# Patient Record
Sex: FEMALE | Race: WHITE | Employment: FULL TIME | URBAN - METROPOLITAN AREA
[De-identification: names, ages, dates, MRNs, and addresses within clinical notes are randomized per-mention and may not be internally consistent; named-entity substitution may affect disease eponyms.]

---

## 2017-11-13 ENCOUNTER — OFFICE VISIT - HEALTHEAST (OUTPATIENT)
Dept: FAMILY MEDICINE | Facility: CLINIC | Age: 42
End: 2017-11-13

## 2017-11-13 DIAGNOSIS — J40 BRONCHITIS: ICD-10-CM

## 2017-11-13 DIAGNOSIS — R07.0 THROAT PAIN: ICD-10-CM

## 2017-11-13 RX ORDER — LIFITEGRAST 50 MG/ML
SOLUTION/ DROPS OPHTHALMIC
Status: SHIPPED | COMMUNITY
Start: 2017-11-06

## 2017-11-13 RX ORDER — ALBUTEROL SULFATE 90 UG/1
2 AEROSOL, METERED RESPIRATORY (INHALATION) EVERY 6 HOURS PRN
Qty: 1 EACH | Refills: 0 | Status: SHIPPED | OUTPATIENT
Start: 2017-11-13

## 2017-11-20 ENCOUNTER — COMMUNICATION - HEALTHEAST (OUTPATIENT)
Dept: INTENSIVE CARE | Facility: HOSPITAL | Age: 42
End: 2017-11-20

## 2017-11-20 DIAGNOSIS — J40 BRONCHITIS: ICD-10-CM

## 2018-04-03 ENCOUNTER — OFFICE VISIT - HEALTHEAST (OUTPATIENT)
Dept: AUDIOLOGY | Facility: CLINIC | Age: 43
End: 2018-04-03

## 2018-04-03 DIAGNOSIS — H91.90 HEARING LOSS, UNSPECIFIED HEARING LOSS TYPE, UNSPECIFIED LATERALITY: ICD-10-CM

## 2018-10-04 ENCOUNTER — OFFICE VISIT - HEALTHEAST (OUTPATIENT)
Dept: FAMILY MEDICINE | Facility: CLINIC | Age: 43
End: 2018-10-04

## 2018-10-04 DIAGNOSIS — J20.9 ACUTE BRONCHITIS: ICD-10-CM

## 2018-10-04 RX ORDER — GUAIFENESIN 600 MG/1
1200 TABLET, EXTENDED RELEASE ORAL 2 TIMES DAILY
Status: SHIPPED | COMMUNITY
Start: 2018-10-04

## 2018-10-04 RX ORDER — IBUPROFEN 200 MG
200 TABLET ORAL EVERY 6 HOURS PRN
Status: SHIPPED | COMMUNITY
Start: 2018-10-04

## 2020-02-03 ENCOUNTER — OFFICE VISIT - HEALTHEAST (OUTPATIENT)
Dept: FAMILY MEDICINE | Facility: CLINIC | Age: 45
End: 2020-02-03

## 2020-02-03 DIAGNOSIS — B97.89 VIRAL LARYNGITIS: ICD-10-CM

## 2020-02-03 DIAGNOSIS — J02.9 SORE THROAT: ICD-10-CM

## 2020-02-03 DIAGNOSIS — J02.9 ACUTE VIRAL PHARYNGITIS: ICD-10-CM

## 2020-02-03 DIAGNOSIS — J04.0 VIRAL LARYNGITIS: ICD-10-CM

## 2020-02-03 LAB — DEPRECATED S PYO AG THROAT QL EIA: NORMAL

## 2020-02-04 ENCOUNTER — COMMUNICATION - HEALTHEAST (OUTPATIENT)
Dept: SCHEDULING | Facility: CLINIC | Age: 45
End: 2020-02-04

## 2020-02-04 LAB — GROUP A STREP BY PCR: NORMAL

## 2020-02-05 ENCOUNTER — COMMUNICATION - HEALTHEAST (OUTPATIENT)
Dept: SCHEDULING | Facility: CLINIC | Age: 45
End: 2020-02-05

## 2020-02-05 ENCOUNTER — OFFICE VISIT - HEALTHEAST (OUTPATIENT)
Dept: FAMILY MEDICINE | Facility: CLINIC | Age: 45
End: 2020-02-05

## 2020-02-05 ENCOUNTER — COMMUNICATION - HEALTHEAST (OUTPATIENT)
Dept: FAMILY MEDICINE | Facility: CLINIC | Age: 45
End: 2020-02-05

## 2020-02-05 DIAGNOSIS — J40 BRONCHITIS: ICD-10-CM

## 2020-02-05 DIAGNOSIS — J20.9 ACUTE BRONCHITIS, UNSPECIFIED ORGANISM: ICD-10-CM

## 2020-02-06 ENCOUNTER — COMMUNICATION - HEALTHEAST (OUTPATIENT)
Dept: SCHEDULING | Facility: CLINIC | Age: 45
End: 2020-02-06

## 2020-10-16 ENCOUNTER — RECORDS - HEALTHEAST (OUTPATIENT)
Dept: LAB | Facility: CLINIC | Age: 45
End: 2020-10-16

## 2020-10-16 LAB
SARS-COV-2 PCR COMMENT: NORMAL
SARS-COV-2 RNA SPEC QL NAA+PROBE: NEGATIVE
SARS-COV-2 VIRUS SPECIMEN SOURCE: NORMAL

## 2020-12-31 ENCOUNTER — THERAPY VISIT (OUTPATIENT)
Dept: PHYSICAL THERAPY | Facility: CLINIC | Age: 45
End: 2020-12-31
Payer: COMMERCIAL

## 2020-12-31 DIAGNOSIS — M62.89 PELVIC FLOOR DYSFUNCTION: ICD-10-CM

## 2020-12-31 DIAGNOSIS — K59.00 CONSTIPATION: ICD-10-CM

## 2020-12-31 PROCEDURE — 97535 SELF CARE MNGMENT TRAINING: CPT | Mod: GP | Performed by: PHYSICAL THERAPIST

## 2020-12-31 PROCEDURE — 97161 PT EVAL LOW COMPLEX 20 MIN: CPT | Mod: GP | Performed by: PHYSICAL THERAPIST

## 2020-12-31 NOTE — PROGRESS NOTES
[facility] Initial Evaluation  Subjective:  The history is provided by the patient. No  was used.   Therapist Generated HPI Evaluation  Problem details: Pt reports she had a hysterectomy 5 years ago and notes that after that things have dropped. Had internal hemorrhoids which she had banded and now she has external hemorrhoids which are very painful and bothersome to her. .         Type of problem:  Pelvic dysfunction and incontinence.      Condition occurred with:  After surgery.    Patient reports pain:  Anal.  Pain is described as aching and burning and is intermittent.  Pain is the same all the time.  Since onset symptoms are gradually worsening.  Exacerbated by: bowel movements.  and relieved by nothing.      Restrictions due to condition include:  Working in normal job without restrictions.  Barriers include:  None as reported by patient.    Patient Health History           General health as reported by patient is good.  Pertinent medical history includes: none.   Red flags:  None as reported by patient.  Medical allergies: none.       Current medications:  None.    Current occupation is Pharmacy tech.   Primary job tasks include:  Computer work, lifting/carrying, pushing/pulling, prolonged sitting, prolonged standing and repetitive tasks.                                    Objective:  System                                 Pelvic Dysfunction Evaluation:        Flexibility:    Tightness present at:Iliopsoas  Tightness not present at:  Adductors; Hamstrings or Gluteals    Abdominal Wall:  Abdominal wall pelvic: Tension and congestion noted throughout abdomen, golden in L lower and cetnral quadrants.        Pelvic Clock Exam:    Ischiocavernosis pain:  -  Bulbocavernosis pain:  -  Transverse Perineal:  -  Levator ANI:  -  Perineal Body:  -      External Assessment:    Skin Condition:  Irritation          Muscle Contraction/Perineal Mobility:  Slight lift, no urogential  triangle descent                         General     ROS  SUBJECTIVE:        Verbal permission from patient to do internal pelvis muscle assessment?Yes Verbal permission to complete external perineal assessment? Yes Would you like someone else in the room as a chaperone? No      Urination:  Do you leak on the way to the bathroom or with a strong urge to void? No   Do you leak with cough,sneeze, jumping, running?Yes   Any other activities that cause leaking? No   Do you have triggers that make you feel you can't wait to go to the bathroom? No what are they NA.  Type of pad and number used per day? 1 pantiliner  When you leak what is the amount? Small  How long can you delay the need to urinate? 4 hours.   How many times do you get up to urinate at night? 2   Can you stop the flow of urine when on the toilet? No  Is the volume of urine passed usually: medium. (8sec rule= 250ml with average bladder storing 400-600ml)  Do you feel empty when you are done?  Yes  Do you strain to pass urine? No  Do you have a slow or hesitant urinary stream? No  Do you have difficulty initiating the urine stream? No  Is urination painful? No  How many bladder infections have you had in last 12 months? 0  Fluid intake(one glass is 8oz or one cup) 1 glasses/day, 2 (coffee, tea, pop) caffinated glasses/day  0 alcohol glasses/day. 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    Bowel habits:  Frequency of bowel movements? 2 times a day  Consistancy of stool? hard, Kipling Stool Scale 1-5  Do you ignore the urge to defecate? No  Do you strain to pass stool? Yes, at times will have to manually evacuate rectally to get stool out at least 2x/wk.   Used to use Metamucil which didn't seem to help.     Aggrevating factors:  Is loss of stool associated with an activity (lifting, coughing, running) or a food)  A little streaking in her underwear 2x/wk and notes it after she's gone to the bathroom and doesn't think she wipes well.    Are there any foods that increase or decrease your symptoms?  When she doesn't eat  Do you have any food allergies?  No      Sensation:   Can you tell if there is solid, liquid, or gas in the rectum?  Yes  Do you feel the urge to move your bowels?  Yes  Is the urge very strong and difficult to control? No Or weak/absent? Yes  Do you feel the rectum is empty with you finish a bowel movement?   Yes    Do you have abdominal pain?  Not unless she has to poop  Describe the quality of the pain: cramping  What makes your abdominal pain worse? Not going  What makes your abdominal pain better? going  Do you ever have pain that wakes you at night? No    Do you have rectal pain, pressure, or burning?  hemorrhoids get painful every time she poops. Hurts to sit. More pain on the R side around 3:15. Do bleed. Doesn't do anything for them at this point.     Pelvic Pain:  Do you have any pelvic pain with intercourse, exams, use of tampons? Yes  Is initial penetration during intercourse painful? No  Is deeper penetration painful? Yes  Do you use lubricant? No What kind? NA  ?  Given birth? Yes Any complications? Almost lost son. Miscarriage x4  # of vaginal delieveries? 0  # of C-sections?1   # of episiotomies? 0.  Are you sexually active?Yes  Have you ever been worried for your physical safety? No    Do you have any depression, anxiety, panic attacks, excessive stress?  No  Any abdominal or pelvic surgeries? Hysterectomy  Are you having any regular exercise? Yes, walking, stairs, lifting weights, abdominal crutching at [employer], leg press, torso rotation.  Have you practiced the PF(kegel) exercises for 4 or more weeks? No  Thyroid checked? No (related to hair loss, flu-like symptoms, wt gain/loss, fatigue, menopause)  Changed diet lately? Yes, eating healthier. Eats 2 meals/day and snacks 3x throughout the day.     OBSERVATION  Lumbar Posture: Negative  Pelvic symmetry: Negative  Introitus: discharge which was milky and white.  Mild odor and redness noted around introitus. Did ask patient about this and she was unaware. Notes that she has recently been on antibiotics so likely has a yeast infection. Further internal vaginal assessment was deferred until pt can see MD and/or treat possible yeast infection.  Trialed internal rectal assessment however patient had significant pain with palpation around hemorrhoids so was unable to complete exam at this time.  Trendelenberg Sign:Negative    ROM  Single leg standing unilateral hip flexion PSIS:Negative  Standing forward flexion PSIS:Negative  Passive Hip ROM:Negative  LAURITA:Negative  LAURITA with OP:Negative    MUSCLE PERFORMANCE  Baseline PF tone:NT due to discharge and question of infection.  PF Tone with cough: NT due to discharge and question of infection.    Valsalva: NT due to discharge and question of infection.          PALPATION: Pain: significant tenderness noted around anal opening including palpation of hemorrhoids. Unable to complete internal rectal assessment due to pain levels.      Assessment/Plan:    Patient is a 45 year old female with pelvic complaints.    Patient has the following significant findings with corresponding treatment plan.                Diagnosis 1:  Pelvic floor dysfunction  Pain -  manual therapy, self management, education and home program  Decreased ROM/flexibility - manual therapy, therapeutic exercise, therapeutic activity and home program  Decreased strength - therapeutic exercise, therapeutic activities and home program  Decreased proprioception - neuro re-education, therapeutic activities and home program  Impaired muscle performance - biofeedback, neuro re-education and home program  Decreased function - therapeutic activities and home program  Impaired posture - neuro re-education, therapeutic activities and home program    Therapy Evaluation Codes:   1) History comprised of:   Personal factors that impact the plan of care:      None.    Comorbidity  factors that impact the plan of care are:      None.     Medications impacting care: None.  2) Examination of Body Systems comprised of:   Body structures and functions that impact the plan of care:      Pelvis.   Activity limitations that impact the plan of care are:      constipation, anal pain.  3) Clinical presentation characteristics are:   Stable/Uncomplicated.  4) Decision-Making    Low complexity using standardized patient assessment instrument and/or measureable assessment of functional outcome.  Cumulative Therapy Evaluation is: Low complexity.    Previous and current functional limitations:  (See Goal Flow Sheet for this information)    Short term and Long term goals: (See Goal Flow Sheet for this information)     Communication ability:  Patient appears to be able to clearly communicate and understand verbal and written communication and follow directions correctly.  Treatment Explanation - The following has been discussed with the patient:   RX ordered/plan of care  Anticipated outcomes  Possible risks and side effects  This patient would benefit from PT intervention to resume normal activities.   Rehab potential is excellent.    Frequency:  1 X week, once daily  Duration:  for 6-8 weeks  Discharge Plan:  Achieve all LTG.  Independent in home treatment program.  Reach maximal therapeutic benefit.    Please refer to the daily flowsheet for treatment today, total treatment time and time spent performing 1:1 timed codes.

## 2021-02-10 ENCOUNTER — RECORDS - HEALTHEAST (OUTPATIENT)
Dept: LAB | Facility: CLINIC | Age: 46
End: 2021-02-10

## 2021-02-10 LAB
ANION GAP SERPL CALCULATED.3IONS-SCNC: 11 MMOL/L (ref 5–18)
BUN SERPL-MCNC: 13 MG/DL (ref 8–22)
CALCIUM SERPL-MCNC: 9.4 MG/DL (ref 8.5–10.5)
CHLORIDE BLD-SCNC: 109 MMOL/L (ref 98–107)
CHOLEST SERPL-MCNC: 191 MG/DL
CO2 SERPL-SCNC: 23 MMOL/L (ref 22–31)
CREAT SERPL-MCNC: 0.73 MG/DL (ref 0.6–1.1)
FASTING STATUS PATIENT QL REPORTED: ABNORMAL
GFR SERPL CREATININE-BSD FRML MDRD: >60 ML/MIN/1.73M2
GLUCOSE BLD-MCNC: 79 MG/DL (ref 70–125)
HDLC SERPL-MCNC: 47 MG/DL
LDLC SERPL CALC-MCNC: 122 MG/DL
POTASSIUM BLD-SCNC: 4.2 MMOL/L (ref 3.5–5)
SODIUM SERPL-SCNC: 143 MMOL/L (ref 136–145)
TRIGL SERPL-MCNC: 112 MG/DL

## 2021-03-25 NOTE — PROGRESS NOTES
DISCHARGE SUMMARY    Lulu Yee was seen 1 times for evaluation and treatment.  Patient did not return for further treatment and current status is unknown.  Due to short treatment duration, no objective or functional changes were made.  Please see goal flow sheet from episode noted date below and initial evaluation for further information.  Patient is discharged from therapy and therapy episode is resolved as of 03/25/21.      Linked Episodes   Type: Episode: Status: Noted: Resolved: Last update: Updated by:   PHYSICAL THERAPY constipation 12/31/2020 Active 12/31/2020 12/31/2020  3:05 PM Mara Davis, PT      Comments:

## 2021-05-27 VITALS
HEART RATE: 93 BPM | SYSTOLIC BLOOD PRESSURE: 119 MMHG | DIASTOLIC BLOOD PRESSURE: 83 MMHG | TEMPERATURE: 98 F | OXYGEN SATURATION: 98 %

## 2021-05-29 ENCOUNTER — RECORDS - HEALTHEAST (OUTPATIENT)
Dept: ADMINISTRATIVE | Facility: CLINIC | Age: 46
End: 2021-05-29

## 2021-05-30 ENCOUNTER — RECORDS - HEALTHEAST (OUTPATIENT)
Dept: ADMINISTRATIVE | Facility: CLINIC | Age: 46
End: 2021-05-30

## 2021-05-31 ENCOUNTER — RECORDS - HEALTHEAST (OUTPATIENT)
Dept: ADMINISTRATIVE | Facility: CLINIC | Age: 46
End: 2021-05-31

## 2021-05-31 VITALS — WEIGHT: 149.9 LBS | BODY MASS INDEX: 24.75 KG/M2

## 2021-06-02 VITALS — WEIGHT: 152.3 LBS | BODY MASS INDEX: 25.15 KG/M2

## 2021-06-04 VITALS
SYSTOLIC BLOOD PRESSURE: 118 MMHG | BODY MASS INDEX: 26.8 KG/M2 | OXYGEN SATURATION: 98 % | TEMPERATURE: 97.8 F | WEIGHT: 162.3 LBS | RESPIRATION RATE: 20 BRPM | HEART RATE: 109 BPM | DIASTOLIC BLOOD PRESSURE: 76 MMHG

## 2021-06-05 ENCOUNTER — RECORDS - HEALTHEAST (OUTPATIENT)
Dept: SCHEDULING | Facility: CLINIC | Age: 46
End: 2021-06-05

## 2021-06-05 DIAGNOSIS — N83.209 OTHER AND UNSPECIFIED OVARIAN CYST: ICD-10-CM

## 2021-06-05 NOTE — TELEPHONE ENCOUNTER
"Pt came back into the clinic after her appt this afternoon demanding the Qvar inhaler.  She was informed that Dr. Armas had left for the day and requested that I send a message to the Dr requesting that she send in the Qvar to the pharmacy.  Pt was informed that the message would not be addressed until tomorrow which she was not happy with and stated that \"it needs to be addressed tonight or she will take her care elsewhere\".    Please advise.  "

## 2021-06-05 NOTE — TELEPHONE ENCOUNTER
RN cannot approve Refill Request    RN can NOT refill this medication PCP messaged that patient is overdue for Office Visit. Last office visit: Visit date not found Last Physical: Visit date not found Last MTM visit: Visit date not found Last visit same specialty: Visit date not found.  Next visit within 3 mo: Visit date not found  Next physical within 3 mo: Visit date not found    Pt called requesting for a refill of the PROAIR albuterol inhaler. Protocol failed PCP or prescribing provider visit in last year.   Was seen in [facility] by Dr. Armas 2/5/2020.    Desiree Vasquez, [facility] Triage/Med Refill 2/5/2020    Requested Prescriptions   Pending Prescriptions Disp Refills     albuterol (PROAIR HFA;PROVENTIL HFA;VENTOLIN HFA) 90 mcg/actuation inhaler 1 each 0     Sig: Inhale 2 puffs every 6 (six) hours as needed for wheezing.       Albuterol/Levalbuterol Refill Protocol Failed - 2/5/2020  9:51 PM        Failed - PCP or prescribing provider visit in last year     Last office visit with prescriber/PCP: Visit date not found OR same dept: Visit date not found OR same specialty: Visit date not found Last physical: Visit date not found       Next appt within 3 mo: Visit date not found  Next physical within 3 mo: Visit date not found  Prescriber OR PCP: Desiree Vasquez RN  Last diagnosis associated with med order: 1. Bronchitis  - albuterol (PROAIR HFA;PROVENTIL HFA;VENTOLIN HFA) 90 mcg/actuation inhaler; Inhale 2 puffs every 6 (six) hours as needed for wheezing.  Dispense: 1 each; Refill: 0    If protocol passes may refill for 6 months if within 3 months of last provider visit (or a total of 9 months). If patient requesting >1 inhaler per month refill x 6 months and have patient make appointment with provider.

## 2021-06-05 NOTE — TELEPHONE ENCOUNTER
"Left detailed message for pt relaying the following message from Dr. Armas:    As we discussed in clinic, Qvar and prednisone are the same thing (both steroids) and the prednisone will do everything the Qvar does. Prescribing both is duplicating treatment without any improvement. Acute \"bronchitis\" should be treated with oral prednisone. Qvar is meant to be a controller medication.   I would like to know how she is feeling after the first 2 doses of prednisone as we discussed. If needing both medications, will need to proceed with PFTs to confirm diagnosis of obstructive lung disease (asthma or COPD) that would warrant an inhaled corticosteroid prescription.   Latesha Armas, DO   "

## 2021-06-05 NOTE — TELEPHONE ENCOUNTER
RN Assessment/Reason for Call:   Okay to leave Detailed Message  Lulu calling in, wanting an inhaler for coughing;   PCP was [facility] gave her QVAr and prednisone last year.  [facility] gave her Decadron and magic mouth wash. Wasn't sure if she needed an inhaler.  Strep test 2/3/2020 was negative.    RN Action/Disposition:  Protocol recommends see Dr in 24 hrs.  Appt [facility] 2/7/2020 [facility]  8:20a  Call back if worse symptoms  Discussed home care measures.  Offered flu immunization.  Agrees to plan.     Reason for Disposition    [1] Continuous (nonstop) coughing interferes with work or school AND [2] no improvement using cough treatment per Care Advice    Protocols used: COUGH - ACUTE [id number]

## 2021-06-05 NOTE — TELEPHONE ENCOUNTER
Who is calling:  patient  Reason for Call:  Patient in calling to get the inhaler her and provider spoke about, she is having a hard time breathing. Offered trg, would like a call back ASAP  Date of last appointment with primary care: na  Okay to leave a detailed message: Yes

## 2021-06-05 NOTE — TELEPHONE ENCOUNTER
Pt was seen in the clinic yesterday for acute Bronchitis and is requesting an Albuterol inhaler be prescribed for her.   Please call Lulu at [phone].    Provider please advise.    Jose Pang RN [facility] Connection Triage/Medication Refill

## 2021-06-14 NOTE — PROGRESS NOTES
Subjective:      Patient ID: Lulu Yee is a 42 y.o. female.    Chief Complaint:    HPI Lulu Yee is a 42 y.o. female who presents today complaining of cough, sore throat, and loss of voice x 3 days. She took Mucinex this morning and Nyquil and cough drops. The Nyquil helped allow her to sleep, but has otherwise not been very helpful. He has no known strep exposures, but her  and son are also feeling ill. She did not receive a flu shot this year. She denies any underlying lung diseases.         Past Surgical History:   Procedure Laterality Date     HYSTERECTOMY  2015     NC LAP,VAG HYST,UTERUS 250GMS/< N/A 11/24/2015    Procedure: HYSTERECTOMY LAPAROSCOPIC ASSISTED VAGINAL, LEFT SALPINGECTOMY;  Surgeon: Risa Maldonado MD;  Location: [facility];  Service: Gynecology       Family History   Problem Relation Age of Onset     Breast cancer Maternal Grandmother        Social History   Substance Use Topics     Smoking status: Never Smoker     Smokeless tobacco: None     Alcohol use No       Review of Systems   Constitutional: Positive for fatigue. Negative for fever.   HENT: Positive for sore throat and voice change. Negative for congestion, ear pain, sinus pain and sinus pressure.    Respiratory: Positive for cough and chest tightness. Negative for shortness of breath.    Gastrointestinal: Negative for abdominal pain, diarrhea, nausea and vomiting.       Objective:     /80  Pulse (!) 110  Temp 98.3  F (36.8  C) (Oral)   Resp 18  Wt 149 lb 14.4 oz (68 kg)  LMP 11/24/2015 (Exact Date)  SpO2 99%  BMI 24.75 kg/m2    Physical Exam   Constitutional: She appears well-developed and well-nourished. No distress.   Mild diaphoresis after patient took her coat off.   HENT:   Head: Normocephalic and atraumatic.   Right Ear: External ear normal.   Left Ear: External ear normal.   Eyes: Conjunctivae are normal.   Cardiovascular: Normal rate, regular rhythm and normal heart sounds.  Exam reveals no  gallop and no friction rub.    No murmur heard.  Pulmonary/Chest: Effort normal and breath sounds normal. No respiratory distress.   Coarse lung sounds throughout. Pateint   Skin: She is diaphoretic.   Psychiatric: She has a normal mood and affect. Her behavior is normal. Judgment and thought content normal.   Nursing note and vitals reviewed.    Labs:  Recent Results (from the past 24 hour(s))   Rapid Strep A Screen-Throat   Result Value Ref Range    Rapid Strep A Antigen No Group A Strep detected, presumptive negative No Group A Strep detected, presumptive negative         Assessment:     Procedures    1. Bronchitis  benzonatate (TESSALON) 200 MG capsule    albuterol (PROAIR HFA;PROVENTIL HFA;VENTOLIN HFA) 90 mcg/actuation inhaler    azithromycin (ZITHROMAX) 250 MG tablet   2. Throat pain  Rapid Strep A Screen-Throat         Patient Instructions   1) Increase fluids and rest  2) Take Tessalon Perles as needed up to three times per day for cough relief.  3) Salt water gargles and lozenges can be helpful for throat relief  4) You will only be notified of the confirmatory strep results if they are positive.   5) Use albuterol inhaler as needed every 6 hours for chest tightness.

## 2021-06-17 NOTE — PROGRESS NOTES
Audiology only; self-referred    History:  Please see chart note/results dated 8-18-16 for additional background information. Hearing was essentially normal, bilaterally, on that date, but she indicated that she is experiencing more difficulty in all listening situations at present, and her spouse thinks she needs hearing aids. She works primarily in the  area at [employer], where there is noise from venting; however, she cannot hear well in quieter environments either. She indicated she must increase volume significantly on her phone in order to use it. She denied tinnitus, dizziness, otalgia, otorrhea, recent illness, or significant noise exposure.    Results:  Otoscopy was unremarkable in either ear. Hearing sensitivity was assessed with good reliability using circumaural phones.      Right ear:   Normal/borderline normal hearing sensitivity for 250-8000 Hz.    Left ear:  Normal/borderline normal hearing sensitivity for 250-2000 Hz and at 6000 Hz; mild mixed hearing loss for 3299-3813 Hz and mild hearing loss (unknown type) at 8000 Hz.  Slight hearing asymmetry was noted between ears; hearing thresholds were essentially stable per 8-18-16 audiogram.     Speech reception thresholds showed agreement with pure tone findings in each ear. Word recognition ability was excellent, bilaterally, with presentation levels above typical conversational volume in both ears.  Speech in noise testing (Quick SIN) yielded responses to +15 dB signal-to-noise ratio in each ear. This latter finding is suggestive of particular auditory processing difficulties related to listening in noise. Additional observation during the appointment today indicated that Ms. Yee also had difficulty hearing in a quiet environment, and relied significantly on visual cues when speaking with the examiner.    Tympanometry was consistent with normal middle ear function, bilaterally.    Recommendations:  Follow-up with PCP; retest hearing per  medical management or patient concern.  Wear hearing protection consistently in noise. Based upon test results, examiner observation, and patient report, further auditory processing testing may be warranted. She was given contact information for Umu Bloom, Ph.D., an audiologist at the [facility] who tests for this concern in adults ([place], [facility]; [phone]). Ms. Yee is not likely to benefit from traditional amplification, as higher noise levels serve as a significant distraction, and hearing is generally normal, bilaterally. Ms. Yee was in verbal agreement with this information and plan.    Silvio Barron., [facility]  [place] Licensed Audiologist [id number]

## 2021-06-18 NOTE — PATIENT INSTRUCTIONS - HE
Patient Instructions by Shannon Sharp MD at 2/3/2020  8:50 AM     Author: Shannon Sharp MD Service: -- Author Type: Physician    Filed: 2/3/2020 10:07 AM Encounter Date: 2/3/2020 Status: Addendum    : Shannon Sharp MD (Physician)    Related Notes: Original Note by Shannon Sharp MD (Physician) filed at 2/3/2020 10:06 AM       1. Keep well hydrated  2. May alternate Tylenol every 6 hours with ibuprofen every 6 hours as needed for fever or pain  3. If follow up is needed, try and be seen at your primary clinic. Or you can be seen by any primary care provider at one of our other [facility] sites  4. If you have any questions, call the clinic number  - it's answered 24/7  - You will be contacted within the next 48 hours ONLY if the confirmatory strep test is positive.   - Antibiotics will be prescribed if indicated.  - No sharing of food or beverage, until 48 hours is past     Patient Education     Viral Pharyngitis (Sore Throat)    You or your child have pharyngitis (sore throat). This infection is caused by a virus. It can cause throat pain that is worse when swallowing, aching all over, headache, and fever. The infection may be spread by coughing, kissing, or touching others after touching your mouth or nose. Antibiotic medicines do not work against viruses. They are not used for treating this illness.  Home care    If symptoms are severe, you or your child should rest at home. Return to work or school when you or your child feel well enough.     You or your child should drink plenty of fluids to prevent dehydration.    Use throat lozenges or numbing throat sprays to help reduce pain. Gargling with warm salt water will also help reduce throat pain. Dissolve 1/2 teaspoon of salt in 1 glass of warm water. Children can sip on juice or a popsicle. Children 5 years and older can also suck on a lollipop or hard candy.    Dont eat salty or spicy foods or give them to your child. These can be irritating  to the throat.  Medicines for a child: You can give your child acetaminophen for fever, fussiness, or discomfort. In babies over 6 months of age, you may use ibuprofen instead of acetaminophen. If your child has chronic liver or kidney disease or ever had a stomach ulcer or GI bleeding, talk with your germain healthcare provider before giving these medicines. Aspirin should never be used by any child under 18 years of age who has a fever. It may cause severe liver damage.  Medicines for an adult: You may use acetaminophen or ibuprofen to control pain or fever, unless another medicine was prescribed for this. If you have chronic liver or kidney disease or ever had a stomach ulcer or GI bleeding, talk with your healthcare provider before using these medicines.  Follow-up care  Follow up with a healthcare provider or our staff if you or your child are not getting better over the next week.  When to seek medical advice  Call your healthcare provider right away if any of these occur:    Fever as directed by your healthcare provider.  For children, seek care if:  ? Your child is of any age and has repeated fevers above 104 F (40 C).  ? Your child is younger than 2 years of age and has a fever of 100.4 F (38 C) for more than 1 day.  ? Your child is 2 years old or older and has a fever of 100.4 F (38 C) for more than 3 days.    New or worsening ear pain, sinus pain, or headache    Painful lumps in the back of neck    Stiff neck    Lymph nodes are getting larger    Cant swallow liquids, a lot of drooling, or cant open mouth wide due to throat pain    Signs of dehydration, such as very dark urine or no urine, sunken eyes, dizziness    Trouble breathing or noisy breathing    Muffled voice    New rash    Other symptoms are getting worse  Date Last Reviewed: 10/1/2017    7032-5338 [employer]. [address] [place] [address], [place] [address]. All rights reserved. This information is not intended as a substitute for  professional medical care. Always follow your healthcare professional's instructions.           Patient Education     Laryngitis    Laryngitis is a swelling of the tissues around the vocal cords. Symptoms include a hoarse (scratchy) voice. Or your voice may be gone for a few days or longer. This may be caused by a viral illness, such as a head or chest cold. It may also be due to overuse and strain of your voice. Smoking, drinking alcohol, acid reflux, allergies, or inhaling harsh chemicals may also lead to symptoms. This condition will usually go away in 1-2 weeks.  Home care    Rest your voice until it recovers. Talk as little as possible. If your symptoms are severe, rest at home for a day or so.    Moist air may help your symptoms. Try breathing cool steam from a humidifier or vaporizer. Or breathe air from a steamy shower.    Drink plenty of fluids to stay well hydrated.    Do not smoke  Follow-up care  Follow up with your healthcare provider or this facility if you are not better after 1 week. If your hoarse voice lasts more than 2 weeks, you may need to see an otolaryngologist. This is a doctor who treats diseases and disorders of the ear, nose, and throat (ENT). Seeing this doctor is especially important if you have a history of alcohol or tobacco use.  When to seek medical advice  Contact your healthcare provider if you have any of the following:    Symptoms that get worse    Severe pain with swallowing    Trouble opening your mouth    Neck swelling, neck pain, or trouble moving your neck    Noisy breathing or trouble breathing    Fever of 100.4 F (38. C) or higher, or as directed by your healthcare provider    Drooling    Symptoms do not go away in 2 weeks  Date Last Reviewed: 11/1/2017 2000-2017 [employer]. [address] [place] [address], [place] [address]. All rights reserved. This information is not intended as a substitute for professional medical care. Always follow your healthcare  professional's instructions.

## 2021-06-20 NOTE — PROGRESS NOTES
Subjective:      Patient ID: Lulu Yee is a 43 y.o. female.    Chief Complaint:   Chief Complaint   Patient presents with     Cough     x 8 days with a cold and sneezing. Last year around this time she was Rx Prednisone and felt a lot better.          HPI : Had fever upon onset of sx (99-something per patient) with sneezing, cough.  Works in outpatient chemotherapy as a technician.  Taking Mucinex-D, Afrin nasal spray, Sudafed.  Helping somewhat.  Denies h/o asthma.  Denies h/o seasonal allergies.        No past medical history on file.    Past Surgical History:   Procedure Laterality Date     HYSTERECTOMY  2015     ME LAP,VAG HYST,UTERUS 250GMS/< N/A 11/24/2015    Procedure: HYSTERECTOMY LAPAROSCOPIC ASSISTED VAGINAL, LEFT SALPINGECTOMY;  Surgeon: Risa Maldonado MD;  Location: [facility];  Service: Gynecology       Family History   Problem Relation Age of Onset     Breast cancer Maternal Grandmother        Social History   Substance Use Topics     Smoking status: Never Smoker     Smokeless tobacco: Never Used     Alcohol use No       Review of Systems   Constitutional: Positive for fever. Negative for chills.   HENT: Positive for postnasal drip, rhinorrhea, sinus pain (Better with medicine ), sinus pressure and sneezing. Negative for sore throat.    Eyes: Negative for redness.   Respiratory: Positive for cough. Negative for chest tightness, shortness of breath and wheezing.        Objective:     /90 (Patient Site: Right Arm, Patient Position: Sitting, Cuff Size: Adult Regular)  Pulse 90  Temp 98.1  F (36.7  C) (Oral)   Resp 16  Wt 152 lb 4.8 oz (69.1 kg)  LMP 11/24/2015 (Exact Date)  SpO2 98%  Breastfeeding? No  BMI 25.15 kg/m2    Physical Exam   Constitutional: She is oriented to person, place, and time.   HENT:   Nose: Rhinorrhea present. Right sinus exhibits no maxillary sinus tenderness and no frontal sinus tenderness. Left sinus exhibits no maxillary sinus tenderness and no frontal  sinus tenderness.   Mouth/Throat: Oropharynx is clear and moist. No oropharyngeal exudate.   Eyes: Conjunctivae are normal. Right eye exhibits no discharge. Left eye exhibits no discharge.   Cardiovascular: Normal rate and regular rhythm.    Pulmonary/Chest: Effort normal and breath sounds normal.   Musculoskeletal: Normal range of motion.   Lymphadenopathy:     She has no cervical adenopathy.   Neurological: She is alert and oriented to person, place, and time.   Skin: Skin is warm and dry. No rash noted.   Psychiatric: She has a normal mood and affect. Her behavior is normal. Judgment and thought content normal.            Assessment:     Procedures    The encounter diagnosis was Acute bronchitis.    Plan:     Diagnoses and all orders for this visit:    Acute bronchitis    Stop Afrin - can only take this x 3 days.      Can try Allegra, Claritin daily to see if there is an allergic component - try for about a week.  Stop if not effective.      Continue Mucinex.      Tea with honey.      OK to work with a mask until better.     No indication for antibiotics or prednisone at this time.    At the end of the encounter, I discussed results, diagnosis, medications. Discussed red flags for immediate return to clinic/ER, as well as indications for follow up if no improvement. Patient and/or caregiver  understood and agreed to plan. Patient was stable for discharge.

## 2021-06-20 NOTE — LETTER
Letter by Shannon Sharp MD at      Author: Shannon Sharp MD Service: -- Author Type: --    Filed:  Encounter Date: 2/3/2020 Status: (Other)         February 3, 2020     Patient: Lulu Yee   YOB: 1975   Date of Visit: 2/3/2020       To Whom it May Concern:    Lulu Yee was seen in my clinic on 2/3/2020.    She should be able to return to work on 2-6-2020 or sooner if feeling better.     If you have any questions or concerns, please don't hesitate to call.    Sincerely,         Electronically signed by Shannon Sharp MD

## 2021-06-28 NOTE — PROGRESS NOTES
Progress Notes by Shannon Sharp MD at 2/3/2020  8:50 AM     Author: Shannon Sharp MD Service: -- Author Type: Physician    Filed: 2/3/2020 10:17 AM Encounter Date: 2/3/2020 Status: Signed    : Shannon Sharp MD (Physician)         Subjective:   Lulu Yee is a 44 y.o. female  Roomed by: FRANCE Chase    Refills needed? No    Do you have any forms that need to be filled out? No      Chief Complaint   Patient presents with   ? Sore Throat     x 4 days,has treid OTC meds not effective   Says sore throat started on 1/29 and has not improved. Have been coughing some with some phlegm. Denies any sweats, chills, CP, shortness of breath, nasal congestion, runny nose, fatigue, or headache. Says recently started getting hoarse. Denies any trouble swallowing. Says that last year got prednisone and Qvar for similar symptoms and her symptoms improved quickly.   Review of Systems  See HPI for ROS, otherwise balance of other systems negative    No Known Allergies    Current Outpatient Medications:   ?  albuterol (PROAIR HFA;PROVENTIL HFA;VENTOLIN HFA) 90 mcg/actuation inhaler, Inhale 2 puffs every 6 (six) hours as needed for wheezing., Disp: 1 each, Rfl: 0  ?  artificial tears,hypromellose, (GENTEAL) 0.3 % Drop, Administer 2 drops to both eyes every 4 (four) hours as needed (dry eyes)., Disp: , Rfl:   ?  benzonatate (TESSALON) 200 MG capsule, Take 1 capsule (200 mg total) by mouth 3 (three) times a day as needed., Disp: 21 capsule, Rfl: 0  ?  cholecalciferol, vitamin D3, 1,000 unit tablet, Take 2,000 Units by mouth daily. Supplement if Multivitamin used does not have at least 2000 units of Vit D, Disp: , Rfl:   ?  guaiFENesin ER (MUCINEX) 600 mg 12 hr tablet, Take 1,200 mg by mouth 2 (two) times a day., Disp: , Rfl:   ?  ibuprofen (ADVIL,MOTRIN) 200 MG tablet, Take 200 mg by mouth every 6 (six) hours as needed for pain., Disp: , Rfl:   ?  multivitamin therapeutic (THERAGRAN) tablet, Take 1 tablet by mouth daily.,  Disp: , Rfl:   ?  pseudoephedrine HCl (SUDAFED 12 HOUR ORAL), Take by mouth., Disp: , Rfl:   ?  XIIDRA 5 % Dpet, , Disp: , Rfl:   Patient Active Problem List   Diagnosis   ? S/P hysterectomy     No past medical history on file. - if none on file, see Problem List    Objective:     Vitals:    02/03/20 0911   BP: 119/83   Patient Site: Right Arm   Patient Position: Sitting   Cuff Size: Adult Regular   Pulse: 93   Temp: 98  F (36.7  C)   TempSrc: Oral   SpO2: 98%   Gen - Pt in NAD  Nose -  non congested, no nasal drainage  Pharynx - not injected, tonsils 1+ size  Neck - no cervical adenopathy  Cor - RRR w/o murmur  Lungs - Good air entry, no wheezes or crackles noted; no coughing noted    Results for orders placed or performed in visit on 02/03/20   Rapid Strep A Screen-Throat swab   Result Value Ref Range    Rapid Strep A Antigen No Group A Strep detected, presumptive negative No Group A Strep detected, presumptive negative   Lab result discussed on day of visit.     No results found.     Assessment - Plan   Medical Decision Making - No clinical findings indicative of bacterial infection requiring antibiotics, such as pneumonia, sinusitis or otitis media were ascertained from today's evaluation. RS was negative.  Dexamethasone and lidocaine gargle ordered for symptomatic treatment of her sore throat.  Presentation and clinical findings are consistent with a viral process. Symptomatic management and when to follow up discussed as described in Patient Instructions.     1. Acute viral pharyngitis  - dexamethasone injection 8 mg (DECADRON)    2. Viral laryngitis    3. Sore throat  - Rapid Strep A Screen-Throat swab  - Group A Strep, RNA Direct Detection, Throat  - alum/mag hydrox-simethicone-diphenhydramine-lidocaine (MAGIC MOUTHWASH) suspension; Gargle and spit out 15 cc ( 1 tablespoon) every 4-6 hours, but no more than 4 doses in 24 hours  Dispense: 120 mL; Refill: 0      At the conclusion of the encounter, assessment  and plan were discussed.   All questions were answered.   The patient or guardian acknowledged understanding and was involved in the decision making regarding the overall care plan.    Patient Instructions   1. Keep well hydrated  2. May alternate Tylenol every 6 hours with ibuprofen every 6 hours as needed for fever or pain  3. If follow up is needed, try and be seen at your primary clinic. Or you can be seen by any primary care provider at one of our other [facility] sites  4. If you have any questions, call the clinic number  - it's answered 24/7  - You will be contacted within the next 48 hours ONLY if the confirmatory strep test is positive.   - Antibiotics will be prescribed if indicated.  - No sharing of food or beverage, until 48 hours is past     Patient Education     Viral Pharyngitis (Sore Throat)    You or your child have pharyngitis (sore throat). This infection is caused by a virus. It can cause throat pain that is worse when swallowing, aching all over, headache, and fever. The infection may be spread by coughing, kissing, or touching others after touching your mouth or nose. Antibiotic medicines do not work against viruses. They are not used for treating this illness.  Home care    If symptoms are severe, you or your child should rest at home. Return to work or school when you or your child feel well enough.     You or your child should drink plenty of fluids to prevent dehydration.    Use throat lozenges or numbing throat sprays to help reduce pain. Gargling with warm salt water will also help reduce throat pain. Dissolve 1/2 teaspoon of salt in 1 glass of warm water. Children can sip on juice or a popsicle. Children 5 years and older can also suck on a lollipop or hard candy.    Dont eat salty or spicy foods or give them to your child. These can be irritating to the throat.  Medicines for a child: You can give your child acetaminophen for fever, fussiness, or discomfort. In babies over 6 months of  age, you may use ibuprofen instead of acetaminophen. If your child has chronic liver or kidney disease or ever had a stomach ulcer or GI bleeding, talk with your germain healthcare provider before giving these medicines. Aspirin should never be used by any child under 18 years of age who has a fever. It may cause severe liver damage.  Medicines for an adult: You may use acetaminophen or ibuprofen to control pain or fever, unless another medicine was prescribed for this. If you have chronic liver or kidney disease or ever had a stomach ulcer or GI bleeding, talk with your healthcare provider before using these medicines.  Follow-up care  Follow up with a healthcare provider or our staff if you or your child are not getting better over the next week.  When to seek medical advice  Call your healthcare provider right away if any of these occur:    Fever as directed by your healthcare provider.  For children, seek care if:  ? Your child is of any age and has repeated fevers above 104 F (40 C).  ? Your child is younger than 2 years of age and has a fever of 100.4 F (38 C) for more than 1 day.  ? Your child is 2 years old or older and has a fever of 100.4 F (38 C) for more than 3 days.    New or worsening ear pain, sinus pain, or headache    Painful lumps in the back of neck    Stiff neck    Lymph nodes are getting larger    Cant swallow liquids, a lot of drooling, or cant open mouth wide due to throat pain    Signs of dehydration, such as very dark urine or no urine, sunken eyes, dizziness    Trouble breathing or noisy breathing    Muffled voice    New rash    Other symptoms are getting worse  Date Last Reviewed: 10/1/2017    [id number] [employer]. [address] [place] [address], [place] [address]. All rights reserved. This information is not intended as a substitute for professional medical care. Always follow your healthcare professional's instructions.           Patient Education     Laryngitis    Laryngitis is a  swelling of the tissues around the vocal cords. Symptoms include a hoarse (scratchy) voice. Or your voice may be gone for a few days or longer. This may be caused by a viral illness, such as a head or chest cold. It may also be due to overuse and strain of your voice. Smoking, drinking alcohol, acid reflux, allergies, or inhaling harsh chemicals may also lead to symptoms. This condition will usually go away in 1-2 weeks.  Home care    Rest your voice until it recovers. Talk as little as possible. If your symptoms are severe, rest at home for a day or so.    Moist air may help your symptoms. Try breathing cool steam from a humidifier or vaporizer. Or breathe air from a steamy shower.    Drink plenty of fluids to stay well hydrated.    Do not smoke  Follow-up care  Follow up with your healthcare provider or this facility if you are not better after 1 week. If your hoarse voice lasts more than 2 weeks, you may need to see an otolaryngologist. This is a doctor who treats diseases and disorders of the ear, nose, and throat (ENT). Seeing this doctor is especially important if you have a history of alcohol or tobacco use.  When to seek medical advice  Contact your healthcare provider if you have any of the following:    Symptoms that get worse    Severe pain with swallowing    Trouble opening your mouth    Neck swelling, neck pain, or trouble moving your neck    Noisy breathing or trouble breathing    Fever of 100.4 F (38. C) or higher, or as directed by your healthcare provider    Drooling    Symptoms do not go away in 2 weeks  Date Last Reviewed: 11/1/2017 2000-2017 [employer]. [address] [place] [address], [place] [address]. All rights reserved. This information is not intended as a substitute for professional medical care. Always follow your healthcare professional's instructions.

## 2021-08-15 ENCOUNTER — HEALTH MAINTENANCE LETTER (OUTPATIENT)
Age: 46
End: 2021-08-15

## 2021-10-11 ENCOUNTER — HEALTH MAINTENANCE LETTER (OUTPATIENT)
Age: 46
End: 2021-10-11

## 2022-09-24 ENCOUNTER — HEALTH MAINTENANCE LETTER (OUTPATIENT)
Age: 47
End: 2022-09-24

## 2023-10-14 ENCOUNTER — HEALTH MAINTENANCE LETTER (OUTPATIENT)
Age: 48
End: 2023-10-14

## 2023-11-17 ENCOUNTER — MYC MEDICAL ADVICE (OUTPATIENT)
Dept: FAMILY MEDICINE | Facility: CLINIC | Age: 48
End: 2023-11-17

## 2023-11-18 ENCOUNTER — LAB REQUISITION (OUTPATIENT)
Dept: LAB | Facility: HOSPITAL | Age: 48
End: 2023-11-18

## 2023-11-18 PROCEDURE — 87635 SARS-COV-2 COVID-19 AMP PRB: CPT | Performed by: INTERNAL MEDICINE

## 2023-11-19 LAB — SARS-COV-2 RNA RESP QL NAA+PROBE: POSITIVE

## 2025-04-10 ENCOUNTER — ANCILLARY PROCEDURE (OUTPATIENT)
Dept: MAMMOGRAPHY | Facility: CLINIC | Age: 50
End: 2025-04-10
Attending: PHYSICIAN ASSISTANT
Payer: COMMERCIAL

## 2025-04-10 DIAGNOSIS — Z12.31 VISIT FOR SCREENING MAMMOGRAM: ICD-10-CM

## 2025-04-10 PROCEDURE — 77063 BREAST TOMOSYNTHESIS BI: CPT

## 2025-04-10 PROCEDURE — 77067 SCR MAMMO BI INCL CAD: CPT

## 2025-04-26 ENCOUNTER — HEALTH MAINTENANCE LETTER (OUTPATIENT)
Age: 50
End: 2025-04-26